# Patient Record
(demographics unavailable — no encounter records)

---

## 2025-01-22 NOTE — HISTORY OF PRESENT ILLNESS
[FreeTextEntry1] : 47yo  LMP 25  10/2024 here for annual Gyn exam. Pt is S/P Hysteroscopy D&C 19. Still has intermittent vaginal odor(fishy) which is very noticeable after sex. She uses Boric acid suppositories intermittently and usually for 3d following intercourse. Has had hemorrhoidal discomfort lately, Preparation H is only minimally helpful. Requests rx for Epifoam which worked well in the past.Is  from  but still living together. He has thyroid Ca and has had thyroidectomy, still having hormonal issues/severe depression.      OB History Total pregnancies 3. Total living children 2. Miscarriage(s) 1. Pregnancy 1: 2004 , spontaneous . Pregnancy 2: 2005, normal spontaneous vaginal delivery (), Birth weight:8lb 13oz, Female "Ne". Pregnancy 3 2008, normal spontaneous vaginal delivery (), Birth weight:7lb 7oz, Male "Davonte".    [Mammogramdate] : 4/2/24 [TextBox_19] : BIRADS 2D T-C Risk 13.3% [BreastSonogramDate] : 4/2/24 [TextBox_25] : BIRADS 2 [PapSmeardate] : 12/14/23 [TextBox_31] : Neg/HPV Neg [ColonoscopyDate] : never [TextBox_43] : Dr. Reeder at [facility]-had scheduled twice but couldn't tolerate prep